# Patient Record
Sex: FEMALE | Race: WHITE | HISPANIC OR LATINO | Employment: UNEMPLOYED | URBAN - METROPOLITAN AREA
[De-identification: names, ages, dates, MRNs, and addresses within clinical notes are randomized per-mention and may not be internally consistent; named-entity substitution may affect disease eponyms.]

---

## 2020-09-04 ENCOUNTER — OFFICE VISIT (OUTPATIENT)
Dept: URGENT CARE | Facility: CLINIC | Age: 40
End: 2020-09-04

## 2020-09-04 VITALS
HEIGHT: 62 IN | WEIGHT: 170 LBS | RESPIRATION RATE: 14 BRPM | BODY MASS INDEX: 31.28 KG/M2 | HEART RATE: 85 BPM | OXYGEN SATURATION: 99 % | TEMPERATURE: 98.3 F

## 2020-09-04 DIAGNOSIS — Z11.59 SPECIAL SCREENING EXAMINATION FOR UNSPECIFIED VIRAL DISEASE: Primary | ICD-10-CM

## 2020-09-04 PROCEDURE — U0003 INFECTIOUS AGENT DETECTION BY NUCLEIC ACID (DNA OR RNA); SEVERE ACUTE RESPIRATORY SYNDROME CORONAVIRUS 2 (SARS-COV-2) (CORONAVIRUS DISEASE [COVID-19]), AMPLIFIED PROBE TECHNIQUE, MAKING USE OF HIGH THROUGHPUT TECHNOLOGIES AS DESCRIBED BY CMS-2020-01-R: HCPCS | Performed by: FAMILY MEDICINE

## 2020-09-04 PROCEDURE — 99203 OFFICE O/P NEW LOW 30 MIN: CPT | Performed by: FAMILY MEDICINE

## 2020-09-04 NOTE — PROGRESS NOTES
[facility]        NAME: Juan Pablo Lyman is a 45 y o  female  : 1980    MRN: [id number]  DATE: 2020  TIME: 4:59 PM    Assessment and Plan   Special screening examination for unspecified viral disease [Z11 59]  1  Special screening examination for unspecified viral disease  Novel Coronavirus (COVID-19), PCR [facility] - Office Collection         Patient Instructions   COVID test done today  Results may take 2-3 days  Check [facility] [employer] [facility] chart application/Internet patient portal for results  Continue COVID precautions  Monitor for development of any signs and symptoms and call your primary care physician if these occur  Follow up with PCP in 3-5 days  Proceed to  ER if symptoms worsen  Chief Complaint     Chief Complaint   Patient presents with   31 60 98     for travel no symptoms         History of Present Illness       Patient presents for COVID test she needs for travel to [facility] next week  She is asymptomatic  No known exposure to COVID  Review of Systems   Review of Systems   Constitutional: Negative for chills, fatigue and fever  Respiratory: Negative for cough, chest tightness, shortness of breath and stridor  Cardiovascular: Negative for chest pain and palpitations  Skin: Negative for rash  Current Medications     No current outpatient medications on file  Current Allergies     Allergies as of 2020    (No Known Allergies)            The following portions of the patient's history were reviewed and updated as appropriate: allergies, current medications, past family history, past medical history, past social history, past surgical history and problem list      History reviewed  No pertinent past medical history  History reviewed  No pertinent surgical history  History reviewed  No pertinent family history  Medications have been verified          Objective   Ht 5' 2" (1 575 m)   Wt 77 1 kg (170 lb)   BMI 31 09 kg/m² Physical Exam     Physical Exam  Constitutional:       General: She is not in acute distress  Appearance: She is not ill-appearing, toxic-appearing or diaphoretic  Eyes:      General:         Right eye: No discharge  Left eye: No discharge  Extraocular Movements: Extraocular movements intact  Conjunctiva/sclera: Conjunctivae normal       Pupils: Pupils are equal, round, and reactive to light  Cardiovascular:      Rate and Rhythm: Normal rate and regular rhythm  Heart sounds: Normal heart sounds  No murmur  No friction rub  No gallop  Pulmonary:      Effort: Pulmonary effort is normal  No respiratory distress  Breath sounds: Normal breath sounds  No stridor  No wheezing, rhonchi or rales  Skin:     General: Skin is warm and dry  Coloration: Skin is not pale  Findings: No erythema or rash  Neurological:      Mental Status: She is alert

## 2020-09-04 NOTE — PATIENT INSTRUCTIONS
COVID test done today  Results may take 2-3 days  Check [facility] my chart application/Internet patient portal for results  Continue COVID precautions  Monitor for development of any signs and symptoms and call your primary care physician if these occur

## 2020-09-06 ENCOUNTER — TELEPHONE (OUTPATIENT)
Dept: OTHER | Facility: OTHER | Age: 40
End: 2020-09-06

## 2020-09-06 LAB — SARS-COV-2 RNA SPEC QL NAA+PROBE: NOT DETECTED

## 2020-09-06 NOTE — TELEPHONE ENCOUNTER
The patient was called for notification of a test result for COVID-19  The patient did not answer the phone and a voicemail was left requesting a call back to [phone], Option 7

## 2020-09-07 NOTE — TELEPHONE ENCOUNTER
The patient was called for notification of a test result for COVID-19  The patient did not answer the phone and a voicemail was left in [place] requesting a call back to [phone], Option 7

## 2021-06-19 ENCOUNTER — HOSPITAL ENCOUNTER (EMERGENCY)
Facility: HOSPITAL | Age: 41
Discharge: HOME | End: 2021-06-19
Attending: EMERGENCY MEDICINE | Admitting: EMERGENCY MEDICINE
Payer: COMMERCIAL

## 2021-06-19 VITALS
HEART RATE: 111 BPM | OXYGEN SATURATION: 99 % | SYSTOLIC BLOOD PRESSURE: 140 MMHG | TEMPERATURE: 97.7 F | DIASTOLIC BLOOD PRESSURE: 89 MMHG | RESPIRATION RATE: 16 BRPM

## 2021-06-19 DIAGNOSIS — S61.210A LACERATION OF RIGHT INDEX FINGER: Primary | ICD-10-CM

## 2021-06-19 PROCEDURE — 12001 RPR S/N/AX/GEN/TRNK 2.5CM/<: CPT | Performed by: EMERGENCY MEDICINE

## 2021-06-19 PROCEDURE — 90471 IMMUNIZATION ADMIN: CPT

## 2021-06-19 PROCEDURE — 90715 TDAP VACCINE 7 YRS/> IM: CPT | Performed by: EMERGENCY MEDICINE

## 2021-06-19 PROCEDURE — 99282 EMERGENCY DEPT VISIT SF MDM: CPT

## 2021-06-19 PROCEDURE — 99283 EMERGENCY DEPT VISIT LOW MDM: CPT | Performed by: EMERGENCY MEDICINE

## 2021-06-19 RX ORDER — GINSENG 100 MG
1 CAPSULE ORAL ONCE
Status: COMPLETED | OUTPATIENT
Start: 2021-06-19 | End: 2021-06-19

## 2021-06-19 RX ORDER — LIDOCAINE HYDROCHLORIDE 20 MG/ML
5 INJECTION, SOLUTION EPIDURAL; INFILTRATION; INTRACAUDAL; PERINEURAL ONCE
Status: COMPLETED | OUTPATIENT
Start: 2021-06-19 | End: 2021-06-19

## 2021-06-19 RX ADMIN — TETANUS TOXOID, REDUCED DIPHTHERIA TOXOID AND ACELLULAR PERTUSSIS VACCINE, ADSORBED 0.5 ML: 5; 2.5; 8; 8; 2.5 SUSPENSION INTRAMUSCULAR at 18:34

## 2021-06-19 RX ADMIN — LIDOCAINE HYDROCHLORIDE 5 ML: 20 INJECTION, SOLUTION EPIDURAL; INFILTRATION; INTRACAUDAL; PERINEURAL at 18:33

## 2021-06-19 RX ADMIN — BACITRACIN 1 SMALL APPLICATION: 500 OINTMENT TOPICAL at 18:57

## 2021-06-19 NOTE — ED PROVIDER NOTES
History  Chief Complaint   Patient presents with    Finger Laceration     pt cut her right index finger about 30 minutes ago on a piece of metal  pt unsure if UTD on tetanus     81-year-old left-hand dominant female cut her left right index finger on some metal   She denies any numbness tingling or weakness but it does hurt to move it  Her last tetanus is greater than 5 years  No foreign body sensation  She denies any other injury she has otherwise been in her usual state of health  None       History reviewed  No pertinent past medical history  Past Surgical History:   Procedure Laterality Date     SECTION         History reviewed  No pertinent family history  I have reviewed and agree with the history as documented  E-Cigarette/Vaping    E-Cigarette Use Never User      E-Cigarette/Vaping Substances     Social History     Tobacco Use    Smoking status: Never Smoker    Smokeless tobacco: Never Used   Vaping Use    Vaping Use: Never used   Substance Use Topics    Alcohol use: Never    Drug use: Never       Review of Systems   Skin: Positive for wound  Neurological: Negative for weakness and numbness  All other systems reviewed and are negative  Physical Exam  Physical Exam  Vitals and nursing note reviewed  Constitutional:       General: She is not in acute distress  Appearance: She is not ill-appearing, toxic-appearing or diaphoretic  Musculoskeletal:      Right hand: Laceration present  No swelling, deformity, tenderness or bony tenderness  Normal range of motion  Normal strength  Normal sensation  There is no disruption of two-point discrimination  Normal capillary refill  Normal pulse  Comments: Rt hand, Curvilinear 2 cm laceration to the right index finger fat pad FDP and FDS are intact patient can make an okay sign against resistance no evidence of subungual hematoma  Skin:     Capillary Refill: Capillary refill takes less than 2 seconds     Neurological: General: No focal deficit present  Mental Status: She is alert  Cranial Nerves: No cranial nerve deficit  Sensory: No sensory deficit  Motor: No weakness  Coordination: Coordination normal       Gait: Gait normal    Psychiatric:         Mood and Affect: Mood normal          Vital Signs  ED Triage Vitals [06/19/21 1822]   Temperature Pulse Respirations Blood Pressure SpO2   97 7 °F (36 5 °C) (!) 125 16 141/86 97 %      Temp Source Heart Rate Source Patient Position - Orthostatic VS BP Location FiO2 (%)   Temporal Monitor Sitting Left arm --      Pain Score       8           Vitals:    06/19/21 1822 06/19/21 1830   BP: 141/86 140/89   Pulse: (!) 125 (!) 111   Patient Position - Orthostatic VS: Sitting Sitting         Visual Acuity      ED Medications  Medications   tetanus-diphtheria-acellular pertussis (BOOSTRIX) IM injection 0 5 mL (0 5 mL Intramuscular Given 6/19/21 1834)   lidocaine (PF) (XYLOCAINE-MPF) 2 % injection 5 mL (5 mL Infiltration Given 6/19/21 1833)   bacitracin topical ointment 1 small application (1 small application Topical Given 6/19/21 1857)       Diagnostic Studies  Results Reviewed     None                 No orders to display              Procedures  Laceration repair    Date/Time: 6/19/2021 6:52 PM  Performed by: Leny Montana MD  Authorized by: Leny Montana MD   Consent: Verbal consent obtained    Risks and benefits: risks, benefits and alternatives were discussed  Consent given by: patient  Patient understanding: patient states understanding of the procedure being performed  Patient identity confirmed: verbally with patient and arm band  Body area: upper extremity  Location details: right index finger  Laceration length: 2 cm  Anesthesia: digital block    Anesthesia:  Local Anesthetic: lidocaine 2% without epinephrine  Anesthetic total: 4 mL    Sedation:  Patient sedated: no      Wound Dehiscence:  Superficial Wound Dehiscence: simple closure      Procedure Details:  Preparation: Patient was prepped and draped in the usual sterile fashion  Irrigation solution: saline  Irrigation method: syringe  Amount of cleaning: standard  Debridement: none  Degree of undermining: none  Skin closure: 5-0 nylon  Number of sutures: 3  Technique: simple  Approximation: close  Approximation difficulty: simple  Dressing: antibiotic ointment  Patient tolerance: patient tolerated the procedure well with no immediate complications               ED Course                             SBIRT 20yo+      Most Recent Value   SBIRT (24 yo +)   In order to provide better care to our patients, we are screening all of our patients for alcohol and drug use  Would it be okay to ask you these screening questions? No Filed at: 06/19/2021 1833                    [facility]  Number of Diagnoses or Management Options  Laceration of right index finger  Diagnosis management comments: Mdm:  We discussed repair the laceration less likely to have a good outcome with glue will proceed with sutures patient is agreeable reviewed needed keep it clean and dry return with any signs or symptoms of infection      Disposition  Final diagnoses:   Laceration of right index finger - 2 cm simple repair     Time reflects when diagnosis was documented in both MDM as applicable and the Disposition within this note     Time User Action Codes Description Comment    6/19/2021  6:50 PM Nitin Malcolm Add [S61 210A] Laceration of right index finger     6/19/2021  6:51 PM Nitin Malcolm Modify [W09 769H] Laceration of right index finger 2 cm simple repair      ED Disposition     ED Disposition Condition Date/Time Comment    Discharge Stable Sat Jun 19, 2021  6:50 PM Nisreen Degroot discharge to home/self care              Follow-up Information     Follow up With Specialties Details Why Contact Info    Carmine Hair MD Family Medicine Go in 10 days 10 days suture removal (3) [place] [address]  [phone]            There are no discharge medications for this patient  No discharge procedures on file      PDMP Review     None          ED Provider  Electronically Signed by           Sebastien Silva MD  06/19/21 6696

## 2021-06-19 NOTE — ED NOTES
Bacitracin, nonadherent bandage and gauze placed over laceration repair on right first digit        Chaya Nagel RN  06/19/21 0033

## 2021-06-24 ENCOUNTER — PATIENT OUTREACH (OUTPATIENT)
Dept: CASE MANAGEMENT | Facility: HOSPITAL | Age: 41
End: 2021-06-24

## 2021-06-24 NOTE — PROGRESS NOTES
Outpatient Care Management Note:  Patient was identified via report as having a recent non urgent and non life threatening ED visit at [address] [place] [address]  Chart reviewed  Patient was in the ED on 06/19/21 for evaluation of finger laceration  Telephone outreach made to introduce self and role of care management, follow up on general health, and outreach for any possible medical or psychosocial services needed  Spoke with patient  ED Follow-up Assessment     Do you have an appointment with your primary care provider? I don't really have a family doctor  2   Do you have a way to get to your appointments? yes    Health Status:    1  Did you get an after visit summary? yes  2  Did you (or caregiver) read the summary? yes  3  Did you understand it? yes (Te[employer])  4  Do you have any questions about your plan of care? no  5  Do you have any new or worsening symptoms? no    Do you have any signs of infection? (new fever/ redness/ swelling/ pus discharge/? no   Does your bandage look clean, dry, and intact? yes   Are you keeping the sutures dry? yes   Do you have any new pain? no  6  Do you know what to do if your symptoms worsen? yes  7  Do you have any questions about your medicines? n/a  8  Other do you plan on contacting a family doctor to get the stitches removed in 5 days? No  I plan to go to the ED  Interventions: (Add to care plan)     Patient/caregiver education provided  ED AVS reviewed with patient including  self care instructions for wound care, OTC meds patient can take for discomfort, topical ointments to use twice daily, reasons for returning to the ED and reasons for when to call PCP  Attempt made to facilitate coordination of medical care  Offered to assist patient with getting an appointment at a local PCP office near patient's home to establish care and for stitch removal   Patient declines    States patient preference is to return to the ED for stitch removal  Patient did not wish to remain on phone to complete SDOH questionniare to assess for social needs  SDOH questionnaire sent via [employer]

## 2021-07-01 ENCOUNTER — DOCUMENTATION (OUTPATIENT)
Dept: FAMILY MEDICINE CLINIC | Facility: CLINIC | Age: 41
End: 2021-07-01

## 2021-07-01 ENCOUNTER — PATIENT OUTREACH (OUTPATIENT)
Dept: CASE MANAGEMENT | Facility: HOSPITAL | Age: 41
End: 2021-07-01

## 2021-07-01 ENCOUNTER — OFFICE VISIT (OUTPATIENT)
Dept: FAMILY MEDICINE CLINIC | Facility: CLINIC | Age: 41
End: 2021-07-01
Payer: COMMERCIAL

## 2021-07-01 VITALS
SYSTOLIC BLOOD PRESSURE: 122 MMHG | BODY MASS INDEX: 32.39 KG/M2 | RESPIRATION RATE: 18 BRPM | DIASTOLIC BLOOD PRESSURE: 90 MMHG | HEIGHT: 60 IN | TEMPERATURE: 97.8 F | HEART RATE: 106 BPM | OXYGEN SATURATION: 98 % | WEIGHT: 165 LBS

## 2021-07-01 DIAGNOSIS — Z12.4 SCREENING FOR CERVICAL CANCER: ICD-10-CM

## 2021-07-01 DIAGNOSIS — S61.210D LACERATION OF RIGHT INDEX FINGER WITHOUT FOREIGN BODY WITHOUT DAMAGE TO NAIL, SUBSEQUENT ENCOUNTER: ICD-10-CM

## 2021-07-01 DIAGNOSIS — Z48.02 VISIT FOR SUTURE REMOVAL: Primary | ICD-10-CM

## 2021-07-01 PROCEDURE — T1015 CLINIC SERVICE: HCPCS | Performed by: FAMILY MEDICINE

## 2021-07-01 NOTE — PROGRESS NOTES
Assessment/Plan:     Diagnoses and all orders for this visit:    Visit for suture removal  -     Suture removal    Laceration of right index finger without foreign body without damage to nail, subsequent encounter  -     Suture removal    Screening for cervical cancer  -     Ambulatory referral to Gynecology; Future        Suture removal    Date/Time: 7/1/2021 10:37 AM  Performed by: Leida Oakley PA-C  Authorized by: Leida Oakley PA-C   Universal Protocol:  Consent: Verbal consent obtained  Consent given by: patient  Time out: Immediately prior to procedure a "time out" was called to verify the correct patient, procedure, equipment, support staff and site/side marked as required  Timeout called at: 7/1/2021 10:15 AM   Patient understanding: patient states understanding of the procedure being performed  Patient identity confirmed: verbally with patient and provided demographic data        Patient location:  Clinic  Location:     Laterality:  Right    Location:  Upper extremity    Upper extremity location:  Hand    Hand location:  R index finger  Procedure details:     Number of sutures removed:  3  Post-procedure details:     Post-removal:  No dressing applied    Patient tolerance of procedure: Tolerated well, no immediate complications        Return if symptoms worsen or fail to improve  Subjective:        Patient ID: Israel Farris is a 45 y o  female  Chief Complaint   Patient presents with    Suture / Staple Removal     3 sutures/ right hand/tip of index finger       Margareth Schreiber is a 27-year-old [address] speaking female presenting as a new patient for suture removal       Patient was evaluated in the ER on 06/19 after cutting her right index finger on a piece of metal   Laceration was repaired with 3 sutures        The following portions of the patient's history were reviewed and updated as appropriate: allergies, current medications, past family history, past medical history, past social history, past surgical history and problem list     There is no problem list on file for this patient  No current outpatient medications on file  No current facility-administered medications for this visit  History reviewed  No pertinent past medical history  Past Surgical History:   Procedure Laterality Date     SECTION          Social History     Socioeconomic History    Marital status: /Civil Union     Spouse name: Not on file    Number of children: Not on file    Years of education: Not on file    Highest education level: Not on file   Occupational History    Not on file   Tobacco Use    Smoking status: Never Smoker    Smokeless tobacco: Never Used   Vaping Use    Vaping Use: Never used   Substance and Sexual Activity    Alcohol use: Not Currently     Comment: special ocassion    Drug use: Never    Sexual activity: Yes     Partners: Male   Other Topics Concern    Not on file   Social History Narrative    Not on file     Social Determinants of Health     Financial Resource Strain:     Difficulty of Paying Living Expenses:    Food Insecurity:     Worried About Running Out of Food in the Last Year:     Ran Out of Food in the Last Year:    Transportation Needs:     Lack of Transportation (Medical):      Lack of Transportation (Non-Medical):    Physical Activity:     Days of Exercise per Week:     Minutes of Exercise per Session:    Stress:     Feeling of Stress :    Social Connections:     Frequency of Communication with Friends and Family:     Frequency of Social Gatherings with Friends and Family:     Attends Muslim Services:     Active Member of Clubs or Organizations:     Attends Club or Organization Meetings:     Marital Status:    Intimate Partner Violence:     Fear of Current or Ex-Partner:     Emotionally Abused:     Physically Abused:     Sexually Abused:         Review of Systems   Constitutional: Negative for chills, diaphoresis and fever    Respiratory: Negative for cough, chest tightness, shortness of breath and wheezing  Cardiovascular: Negative for chest pain, palpitations and leg swelling  Gastrointestinal: Negative for abdominal pain, constipation, diarrhea, nausea and vomiting  Skin: Positive for wound  Negative for rash  Neurological: Negative for dizziness, syncope, weakness, light-headedness and headaches  Objective:      /90 (BP Location: Left arm, Patient Position: Sitting, Cuff Size: Adult)   Pulse (!) 106   Temp 97 8 °F (36 6 °C) (Tympanic)   Resp 18   Ht 5' (1 524 m)   Wt 74 8 kg (165 lb)   LMP 06/12/2021   SpO2 98%   BMI 32 22 kg/m²          Physical Exam  Vitals and nursing note reviewed  Constitutional:       General: She is not in acute distress  Appearance: Normal appearance  HENT:      Head: Normocephalic and atraumatic  Cardiovascular:      Rate and Rhythm: Normal rate and regular rhythm  Heart sounds: Normal heart sounds  No murmur heard  Pulmonary:      Effort: Pulmonary effort is normal  No respiratory distress  Breath sounds: Normal breath sounds  No wheezing  Musculoskeletal:      Right lower leg: No edema  Left lower leg: No edema  Skin:     General: Skin is warm and dry  Comments: 3 sutures, right index finger  Lac healing well without dehiscence, bleeding or drainage   Neurological:      General: No focal deficit present  Mental Status: She is alert and oriented to person, place, and time  Cranial Nerves: No cranial nerve deficit  Motor: No weakness     Psychiatric:         Mood and Affect: Mood normal          Behavior: Behavior normal

## 2021-07-01 NOTE — PROGRESS NOTES
Patient presented to office today for appt, patient was informed to change PCP on her ins card from [facility] to our practice  She said that she did that the other day when she made the appt and it will take 7 days for the switch over  Patient states that she will have Dr Darrius Brody as her PCP

## 2021-07-01 NOTE — PROGRESS NOTES
Out Patient Care Management Note:  Patient has not returned the Social Determinants of Health Questionnaire sent via [employer] on 06/24/21  Of note, patient did follow up with [facility] today to establish care with provider and for suture removal    ED followup episode will be closed at this time

## 2021-12-14 ENCOUNTER — ANNUAL EXAM (OUTPATIENT)
Dept: OBGYN CLINIC | Facility: CLINIC | Age: 41
End: 2021-12-14
Payer: COMMERCIAL

## 2021-12-14 VITALS
WEIGHT: 178 LBS | BODY MASS INDEX: 34.95 KG/M2 | SYSTOLIC BLOOD PRESSURE: 134 MMHG | HEIGHT: 60 IN | DIASTOLIC BLOOD PRESSURE: 70 MMHG

## 2021-12-14 DIAGNOSIS — Z12.31 SCREENING MAMMOGRAM, ENCOUNTER FOR: ICD-10-CM

## 2021-12-14 DIAGNOSIS — Z01.419 WELL WOMAN EXAM WITH ROUTINE GYNECOLOGICAL EXAM: Primary | ICD-10-CM

## 2021-12-14 PROBLEM — E66.09 CLASS 1 OBESITY DUE TO EXCESS CALORIES WITHOUT SERIOUS COMORBIDITY IN ADULT: Status: ACTIVE | Noted: 2021-12-14

## 2021-12-14 PROBLEM — E66.811 CLASS 1 OBESITY DUE TO EXCESS CALORIES WITHOUT SERIOUS COMORBIDITY IN ADULT: Status: ACTIVE | Noted: 2021-12-14

## 2021-12-14 PROCEDURE — 3008F BODY MASS INDEX DOCD: CPT | Performed by: NURSE PRACTITIONER

## 2021-12-14 PROCEDURE — 3725F SCREEN DEPRESSION PERFORMED: CPT | Performed by: NURSE PRACTITIONER

## 2021-12-14 PROCEDURE — 1036F TOBACCO NON-USER: CPT | Performed by: NURSE PRACTITIONER

## 2021-12-14 PROCEDURE — G0476 HPV COMBO ASSAY CA SCREEN: HCPCS | Performed by: NURSE PRACTITIONER

## 2021-12-14 PROCEDURE — 99386 PREV VISIT NEW AGE 40-64: CPT | Performed by: NURSE PRACTITIONER

## 2021-12-14 PROCEDURE — G0143 SCR C/V CYTO,THINLAYER,RESCR: HCPCS | Performed by: NURSE PRACTITIONER

## 2021-12-16 LAB
HPV HR 12 DNA CVX QL NAA+PROBE: NEGATIVE
HPV16 DNA CVX QL NAA+PROBE: NEGATIVE
HPV18 DNA CVX QL NAA+PROBE: NEGATIVE

## 2021-12-21 LAB
LAB AP GYN PRIMARY INTERPRETATION: NORMAL
LAB AP LMP: NORMAL
Lab: NORMAL
PATH INTERP SPEC-IMP: NORMAL

## 2021-12-23 ENCOUNTER — PATIENT OUTREACH (OUTPATIENT)
Dept: CASE MANAGEMENT | Facility: HOSPITAL | Age: 41
End: 2021-12-23

## 2022-01-10 ENCOUNTER — HOSPITAL ENCOUNTER (OUTPATIENT)
Dept: MAMMOGRAPHY | Facility: HOSPITAL | Age: 42
Discharge: HOME | End: 2022-01-10
Payer: COMMERCIAL

## 2022-01-10 VITALS — HEIGHT: 60 IN | BODY MASS INDEX: 34.93 KG/M2 | WEIGHT: 177.91 LBS

## 2022-01-10 DIAGNOSIS — Z12.31 SCREENING MAMMOGRAM, ENCOUNTER FOR: ICD-10-CM

## 2022-01-10 PROCEDURE — 77063 BREAST TOMOSYNTHESIS BI: CPT

## 2022-01-10 PROCEDURE — 77067 SCR MAMMO BI INCL CAD: CPT

## 2023-07-25 NOTE — PROGRESS NOTES
Patient is primarily Greenlandic-speaking. Declined KSE . Prefers to use daughter for translation    Robi Keys is a 44 y.o. female who presents for annual well woman exam.  Last Pap smear 21 NILM/ HR HPV(-). Last mammogram 1/10/22 birads 1, dense breast tissue. Periods are regular every 28-30 days, lasting 7 days. No intermenstrual bleeding, spotting, or discharge. Patient is complaining of vaginal odor for the past 2 weeks. Odor is described as fishy. Associated symptoms include increased amount of white vaginal discharge. Denies irritation or itching. Denies alleviating or aggravating factors. Patient is also complaining of increased hair growth face and chin. States she has noticed it for the last year. Is interested in hormonal labs    Current contraception: tubal ligation  History of abnormal Pap smear: no  Family history of uterine or ovarian cancer: no  Regular self breast exam: no  History of abnormal mammogram: no  Family history of breast cancer: no  History of abnormal lipids: no  Gardasil vaccine:no     Menstrual History:  OB History        4    Para   4    Term   3       1    AB        Living   3       SAB        IAB        Ectopic        Multiple        Live Births   1                Menarche age: 16  Patient's last menstrual period was 07/10/2023 (exact date). Period Cycle (Days):  (monthly)  Period Duration (Days): 7  Menstrual Flow: Heavy, Moderate, Light (5-6 days heavy)  Menstrual Control: Tampon, Maxi pad  Menstrual Control Change Freq (Hours): 4-5 times a day  Dysmenorrhea: None    The following portions of the patient's history were reviewed and updated as appropriate: allergies, current medications, past family history, past medical history, past social history, past surgical history and problem list.    Review of Systems  Pertinent items are noted in HPI.       Objective      /86   Ht 5' (1.524 m)   Wt 84.5 kg (186 lb 3.2 oz)   LMP 07/10/2023 (Exact Date)   BMI 36.36 kg/m²     General:   alert and oriented, in no acute distress, alert, appears stated age and cooperative   Heart: regular rate and rhythm, S1, S2 normal, no murmur, click, rub or gallop   Lungs: clear to auscultation bilaterally   Abdomen: soft, non-tender, without masses or organomegaly   Vulva: normal, Bartholin's, Urethra, LaSalle's normal   Vagina: normal mucosa, no palpable nodules, moderate to large amount of thin gray vaginal discharge noted on exam   Cervix: no cervical motion tenderness and no lesions   Uterus: normal size, non-tender, normal shape and consistency   Adnexa: normal adnexa and no mass, fullness, tenderness   Breast: breasts appear normal, no suspicious masses, no skin or nipple changes or axillary nodes. Assessment      @well woman@ . Plan      All questions answered. Blood tests: Estradiol, FSH, LH, Progesterone level, TSH and Testosterone. Breast self exam technique reviewed and patient encouraged to perform self-exam monthly. Contraception: tubal ligation. Diagnosis explained in detail, including differential.  Dietary diary. Educational material distributed. Follow up in 1 year. Follow up as needed. Mammogram.  Sono-mammogram.  Breast awareness reviewed    Reviewed diagnosis of bacterial vaginosis and treatment with metronidazole. Written information provided. Patient verbalizes understanding to avoid alcohol use while taking medication  Reviewed dense breast tissue on mammogram and recommendation for ABUS. ABUS ordered at today's visit  Encouraged healthy diet, exercise and lifestyle  Encouraged follow-up with PCP as needed  Gardasil vaccine series reviewed. Written information provided. Will call/ [employer] message with results  VBI-    BMI Counseling: Body mass index is 36.36 kg/m².  The BMI is above normal. Nutrition recommendations include reducing portion sizes, decreasing overall calorie intake and 3-5 servings of fruits/vegetables daily. Exercise recommendations include exercising 3-5 times per week, joining a gym and strength training exercises.

## 2023-07-26 ENCOUNTER — ANNUAL EXAM (OUTPATIENT)
Dept: OBGYN CLINIC | Facility: CLINIC | Age: 43
End: 2023-07-26
Payer: COMMERCIAL

## 2023-07-26 VITALS
BODY MASS INDEX: 36.56 KG/M2 | SYSTOLIC BLOOD PRESSURE: 140 MMHG | WEIGHT: 186.2 LBS | DIASTOLIC BLOOD PRESSURE: 86 MMHG | HEIGHT: 60 IN

## 2023-07-26 DIAGNOSIS — B96.89 BACTERIAL VAGINOSIS: ICD-10-CM

## 2023-07-26 DIAGNOSIS — Z01.419 WELL WOMAN EXAM WITH ROUTINE GYNECOLOGICAL EXAM: Primary | ICD-10-CM

## 2023-07-26 DIAGNOSIS — N89.8 VAGINAL ODOR: ICD-10-CM

## 2023-07-26 DIAGNOSIS — L68.0 HIRSUTISM: ICD-10-CM

## 2023-07-26 DIAGNOSIS — N76.0 BACTERIAL VAGINOSIS: ICD-10-CM

## 2023-07-26 DIAGNOSIS — Z12.39 ENCOUNTER FOR BREAST CANCER SCREENING USING NON-MAMMOGRAM MODALITY: ICD-10-CM

## 2023-07-26 DIAGNOSIS — Z12.31 SCREENING MAMMOGRAM, ENCOUNTER FOR: ICD-10-CM

## 2023-07-26 DIAGNOSIS — R92.2 DENSE BREAST TISSUE ON MAMMOGRAM: ICD-10-CM

## 2023-07-26 PROBLEM — R92.30 DENSE BREAST TISSUE ON MAMMOGRAM: Status: ACTIVE | Noted: 2023-07-26

## 2023-07-26 LAB
BV WHIFF TEST VAG QL: ABNORMAL
CLUE CELLS SPEC QL WET PREP: ABNORMAL
PH SMN: 5 [PH]
SL AMB POCT WET MOUNT: ABNORMAL
T VAGINALIS VAG QL WET PREP: ABNORMAL
YEAST VAG QL WET PREP: ABNORMAL

## 2023-07-26 PROCEDURE — 99396 PREV VISIT EST AGE 40-64: CPT | Performed by: NURSE PRACTITIONER

## 2023-07-26 PROCEDURE — 87210 SMEAR WET MOUNT SALINE/INK: CPT | Performed by: NURSE PRACTITIONER

## 2023-07-26 RX ORDER — METRONIDAZOLE 500 MG/1
500 TABLET ORAL EVERY 12 HOURS SCHEDULED
Qty: 14 TABLET | Refills: 0 | Status: SHIPPED | OUTPATIENT
Start: 2023-07-26 | End: 2023-08-02

## 2023-08-05 ENCOUNTER — APPOINTMENT (OUTPATIENT)
Dept: LAB | Facility: MEDICAL CENTER | Age: 43
End: 2023-08-05
Payer: COMMERCIAL

## 2023-08-05 DIAGNOSIS — L68.0 HIRSUTISM: ICD-10-CM

## 2023-08-05 LAB
ESTRADIOL SERPL-MCNC: 55.7 PG/ML
FSH SERPL-ACNC: 8 MIU/ML
LH SERPL-ACNC: 4.5 MIU/ML
PROGEST SERPL-MCNC: 0.44 NG/ML
TSH SERPL DL<=0.05 MIU/L-ACNC: 3.46 UIU/ML (ref 0.45–4.5)

## 2023-08-05 PROCEDURE — 84403 ASSAY OF TOTAL TESTOSTERONE: CPT

## 2023-08-05 PROCEDURE — 84144 ASSAY OF PROGESTERONE: CPT

## 2023-08-05 PROCEDURE — 36415 COLL VENOUS BLD VENIPUNCTURE: CPT

## 2023-08-05 PROCEDURE — 84402 ASSAY OF FREE TESTOSTERONE: CPT

## 2023-08-05 PROCEDURE — 82670 ASSAY OF TOTAL ESTRADIOL: CPT

## 2023-08-05 PROCEDURE — 82627 DEHYDROEPIANDROSTERONE: CPT

## 2023-08-05 PROCEDURE — 83002 ASSAY OF GONADOTROPIN (LH): CPT

## 2023-08-05 PROCEDURE — 83001 ASSAY OF GONADOTROPIN (FSH): CPT

## 2023-08-05 PROCEDURE — 84443 ASSAY THYROID STIM HORMONE: CPT

## 2023-08-07 LAB
DHEA-S SERPL-MCNC: 219 UG/DL (ref 57.3–279.2)
TESTOST FREE SERPL-MCNC: 2.1 PG/ML (ref 0–4.2)
TESTOST SERPL-MCNC: 25 NG/DL (ref 4–50)

## 2023-08-14 DIAGNOSIS — L68.9 EXCESSIVE HAIR GROWTH: Primary | ICD-10-CM

## 2023-08-29 ENCOUNTER — HOSPITAL ENCOUNTER (OUTPATIENT)
Dept: MAMMOGRAPHY | Facility: HOSPITAL | Age: 43
Discharge: HOME | End: 2023-08-29
Payer: COMMERCIAL

## 2023-08-29 DIAGNOSIS — Z12.31 SCREENING MAMMOGRAM, ENCOUNTER FOR: ICD-10-CM

## 2023-08-29 DIAGNOSIS — Z01.419 WELL WOMAN EXAM WITH ROUTINE GYNECOLOGICAL EXAM: ICD-10-CM

## 2023-08-29 PROCEDURE — 77063 BREAST TOMOSYNTHESIS BI: CPT

## 2023-08-29 PROCEDURE — 77067 SCR MAMMO BI INCL CAD: CPT

## 2024-03-25 ENCOUNTER — TELEPHONE (OUTPATIENT)
Dept: FAMILY MEDICINE CLINIC | Facility: CLINIC | Age: 44
End: 2024-03-25

## 2024-06-24 ENCOUNTER — TELEPHONE (OUTPATIENT)
Dept: FAMILY MEDICINE CLINIC | Facility: CLINIC | Age: 44
End: 2024-06-24

## 2024-07-23 ENCOUNTER — PATIENT MESSAGE (OUTPATIENT)
Dept: FAMILY MEDICINE CLINIC | Facility: HOME HEALTHCARE | Age: 44
End: 2024-07-23

## 2024-08-30 ENCOUNTER — TELEPHONE (OUTPATIENT)
Dept: FAMILY MEDICINE CLINIC | Facility: HOME HEALTHCARE | Age: 44
End: 2024-08-30

## 2024-09-27 ENCOUNTER — OFFICE VISIT (OUTPATIENT)
Dept: FAMILY MEDICINE CLINIC | Facility: HOME HEALTHCARE | Age: 44
End: 2024-09-27
Payer: COMMERCIAL

## 2024-09-27 VITALS
WEIGHT: 191.2 LBS | SYSTOLIC BLOOD PRESSURE: 150 MMHG | HEART RATE: 113 BPM | BODY MASS INDEX: 36.1 KG/M2 | TEMPERATURE: 98 F | HEIGHT: 61 IN | DIASTOLIC BLOOD PRESSURE: 90 MMHG | OXYGEN SATURATION: 98 %

## 2024-09-27 DIAGNOSIS — E66.09 CLASS 2 OBESITY DUE TO EXCESS CALORIES WITHOUT SERIOUS COMORBIDITY WITH BODY MASS INDEX (BMI) OF 36.0 TO 36.9 IN ADULT: ICD-10-CM

## 2024-09-27 DIAGNOSIS — Z00.00 ANNUAL PHYSICAL EXAM: Primary | ICD-10-CM

## 2024-09-27 DIAGNOSIS — Z13.0 SCREENING FOR ENDOCRINE, METABOLIC AND IMMUNITY DISORDER: ICD-10-CM

## 2024-09-27 DIAGNOSIS — R03.0 ELEVATED BP WITHOUT DIAGNOSIS OF HYPERTENSION: ICD-10-CM

## 2024-09-27 DIAGNOSIS — E66.812 CLASS 2 OBESITY DUE TO EXCESS CALORIES WITHOUT SERIOUS COMORBIDITY WITH BODY MASS INDEX (BMI) OF 36.0 TO 36.9 IN ADULT: ICD-10-CM

## 2024-09-27 DIAGNOSIS — Z13.228 SCREENING FOR ENDOCRINE, METABOLIC AND IMMUNITY DISORDER: ICD-10-CM

## 2024-09-27 DIAGNOSIS — Z11.4 SCREENING FOR HIV (HUMAN IMMUNODEFICIENCY VIRUS): ICD-10-CM

## 2024-09-27 DIAGNOSIS — N92.1 MENORRHAGIA WITH IRREGULAR CYCLE: ICD-10-CM

## 2024-09-27 DIAGNOSIS — R00.0 TACHYCARDIA: ICD-10-CM

## 2024-09-27 DIAGNOSIS — Z12.31 ENCOUNTER FOR SCREENING MAMMOGRAM FOR BREAST CANCER: ICD-10-CM

## 2024-09-27 DIAGNOSIS — Z11.59 NEED FOR HEPATITIS C SCREENING TEST: ICD-10-CM

## 2024-09-27 DIAGNOSIS — H91.90 DECREASED HEARING, UNSPECIFIED LATERALITY: ICD-10-CM

## 2024-09-27 DIAGNOSIS — Z13.29 SCREENING FOR ENDOCRINE, METABOLIC AND IMMUNITY DISORDER: ICD-10-CM

## 2024-09-27 PROCEDURE — T1015 CLINIC SERVICE: HCPCS | Performed by: FAMILY MEDICINE

## 2024-09-27 RX ORDER — IBUPROFEN 600 MG/1
600 TABLET, FILM COATED ORAL 2 TIMES DAILY
Qty: 30 TABLET | Refills: 0 | Status: SHIPPED | OUTPATIENT
Start: 2024-09-27

## 2024-09-27 NOTE — PATIENT INSTRUCTIONS
"Patient Education     Routine physical for adults   The Basics   Written by the doctors and editors at [facility]   What is a physical? -- A physical is a routine visit, or \"check-up,\" with your doctor. You might also hear it called a \"wellness visit\" or \"preventive visit.\"  During each visit, the doctor will:   Ask about your physical and mental health   Ask about your habits, behaviors, and lifestyle   Do an exam   Give you vaccines if needed   Talk to you about any medicines you take   Give advice about your health   Answer your questions  Getting regular check-ups is an important part of taking care of your health. It can help your doctor find and treat any problems you have. But it's also important for preventing health problems.  A routine physical is different from a \"sick visit.\" A sick visit is when you see a doctor because of a health concern or problem. Since physicals are scheduled ahead of time, you can think about what you want to ask the doctor.  How often should I get a physical? -- It depends on your age and health. In general, for people age 21 years and older:   If you are younger than 50 years, you might be able to get a physical every 3 years.   If you are 50 years or older, your doctor might recommend a physical every year.  If you have an ongoing health condition, like diabetes or high blood pressure, your doctor will probably want to see you more often.  What happens during a physical? -- In general, each visit will include:   Physical exam - The doctor or nurse will check your height, weight, heart rate, and blood pressure. They will also look at your eyes and ears. They will ask about how you are feeling and whether you have any symptoms that bother you.   Medicines - It's a good idea to bring a list of all the medicines you take to each doctor visit. Your doctor will talk to you about your medicines and answer any questions. Tell them if you are having any side effects that bother you. You " "should also tell them if you are having trouble paying for any of your medicines.   Habits and behaviors - This includes:   Your diet   Your exercise habits   Whether you smoke, drink alcohol, or use drugs   Whether you are sexually active   Whether you feel safe at home  Your doctor will talk to you about things you can do to improve your health and lower your risk of health problems. They will also offer help and support. For example, if you want to quit smoking, they can give you advice and might prescribe medicines. If you want to improve your diet or get more physical activity, they can help you with this, too.   Lab tests, if needed - The tests you get will depend on your age and situation. For example, your doctor might want to check your:   Cholesterol   Blood sugar   Iron level   Vaccines - The recommended vaccines will depend on your age, health, and what vaccines you already had. Vaccines are very important because they can prevent certain serious or deadly infections.   Discussion of screening - \"Screening\" means checking for diseases or other health problems before they cause symptoms. Your doctor can recommend screening based on your age, risk, and preferences. This might include tests to check for:   Cancer, such as breast, prostate, cervical, ovarian, colorectal, prostate, lung, or skin cancer   Sexually transmitted infections, such as chlamydia and gonorrhea   Mental health conditions like depression and anxiety  Your doctor will talk to you about the different types of screening tests. They can help you decide which screenings to have. They can also explain what the results might mean.   Answering questions - The physical is a good time to ask the doctor or nurse questions about your health. If needed, they can refer you to other doctors or specialists, too.  Adults older than 65 years often need other care, too. As you get older, your doctor will talk to you about:   How to prevent falling at " home   Hearing or vision tests   Memory testing   How to take your medicines safely   Making sure that you have the help and support you need at home  All topics are updated as new evidence becomes available and our peer review process is complete.  This topic retrieved from [employer] on: May 02, 2024.  Topic 170124 Version 1.0  Release: 32.4.3 - C32.122  © 2024 UpToDate, Inc. and/or its affiliates. All rights reserved.  Consumer Information Use and Disclaimer   Disclaimer: This generalized information is a limited summary of diagnosis, treatment, and/or medication information. It is not meant to be comprehensive and should be used as a tool to help the user understand and/or assess potential diagnostic and treatment options. It does NOT include all information about conditions, treatments, medications, side effects, or risks that may apply to a specific patient. It is not intended to be medical advice or a substitute for the medical advice, diagnosis, or treatment of a health care provider based on the health care provider's examination and assessment of a patient's specific and unique circumstances. Patients must speak with a health care provider for complete information about their health, medical questions, and treatment options, including any risks or benefits regarding use of medications. This information does not endorse any treatments or medications as safe, effective, or approved for treating a specific patient. UpToDate, Inc. and its affiliates disclaim any warranty or liability relating to this information or the use thereof.The use of this information is governed by the Terms of Use, available at https://www.woltersEyeSee360uwer.com/en/know/clinical-effectiveness-terms. 2024© UpToDate, Inc. and its affiliates and/or licensors. All rights reserved.  Copyright   © 2024 UpToDate, Inc. and/or its affiliates. All rights reserved.

## 2024-09-27 NOTE — PROGRESS NOTES
Adult Annual Physical  Name: Carlos Hurley      : 1980      MRN: [id number]  Encounter Provider: Chip Ramírez PA-C  Encounter Date: 2024   Encounter department: [facility]    Assessment & Plan  Annual physical exam         Tachycardia  Reports being prescribed metoprolol for this while living in [place] but stopped taking it as she does not like to take medications.       Elevated BP without diagnosis of hypertension  Elevated at 150/90 today.  Reports being prescribed metoprolol in the past for tachycardia.  Will follow-up in 1 month to review labs and recheck blood pressure.  Discuss treatment at that time.       Class 2 obesity due to excess calories without serious comorbidity with body mass index (BMI) of 36.0 to 36.9 in adult  Referral provided to weight management.  Labs as ordered.  Orders:    Ambulatory Referral to Weight Management; Future    Menorrhagia with irregular cycle  Follow-up with GYN as scheduled.  Will prescribe ibuprofen 600 mg twice daily.  Labs as ordered.  Orders:    ibuprofen (MOTRIN) 600 mg tablet; Take 1 tablet (600 mg total) by mouth 2 (two) times a day    Encounter for screening mammogram for breast cancer    Orders:    Mammo screening bilateral w 3d and cad; Future    Need for hepatitis C screening test    Orders:    Hepatitis C Antibody; Future    Screening for HIV (human immunodeficiency virus)    Orders:    HIV 1/2 AG/AB w Reflex [facility] for 2 yr old and above; Future    Screening for endocrine, metabolic and immunity disorder    Orders:    CBC and differential; Future    Comprehensive metabolic panel; Future    TSH, 3rd generation with Free T4 reflex; Future    Lipid panel; Future    Decreased hearing, unspecified laterality    Orders:    Ambulatory Referral to Audiology; Future    Immunizations and preventive care screenings were discussed with patient today. Appropriate education was printed on patient's after visit  summary.    Counseling:  Alcohol/drug use: discussed moderation in alcohol intake, the recommendations for healthy alcohol use, and avoidance of illicit drug use.  Dental Health: discussed importance of regular tooth brushing, flossing, and dental visits.  Injury prevention: discussed safety/seat belts, safety helmets, smoke detectors, carbon monoxide detectors, and smoking near bedding or upholstery.  Sexual health: discussed sexually transmitted diseases, partner selection, use of condoms, avoidance of unintended pregnancy, and contraceptive alternatives.  Exercise: the importance of regular exercise/physical activity was discussed. Recommend exercise 3-5 times per week for at least 30 minutes.       Depression Screening and Follow-up Plan: Patient was screened for depression during today's encounter. They screened negative with a PHQ-2 score of 0.        History of Present Illness     Adult Annual Physical:  Patient presents for annual physical.     Diet and Physical Activity:  - Diet/Nutrition: well balanced diet.  - Exercise: no formal exercise.    Depression Screening:  - PHQ-2 Score: 0    General Health:  - Sleep: sleeps well and 4-6 hours of sleep on average.  - Hearing: decreased hearing bilateral ears.  - Vision: no vision problems and wears glasses.  - Dental: no dental visits for > 1 year.    /GYN Health:  - Follows with GYN: yes.   - Menopause: premenopausal.   - Last menstrual cycle: 9/15/2024.   - History of STDs: no  - Contraception:. tubal ligation      Advanced Care Planning:  - Has an advanced directive?: no    - Has a durable medical POA?: no      Review of Systems   Constitutional:  Negative for chills, diaphoresis and fever.   Respiratory:  Negative for cough, chest tightness, shortness of breath and wheezing.    Cardiovascular:  Negative for chest pain, palpitations and leg swelling.   Gastrointestinal:  Negative for abdominal pain, constipation, diarrhea, nausea and vomiting.  "  Genitourinary:  Positive for menstrual problem.   Skin:  Negative for rash and wound.   Neurological:  Negative for dizziness, syncope, weakness, light-headedness and headaches.   Psychiatric/Behavioral:  Negative for decreased concentration, dysphoric mood, self-injury, sleep disturbance and suicidal ideas. The patient is not nervous/anxious.      Medical History Reviewed by provider this encounter:  Tobacco  Allergies  Meds  Problems  Med Hx  Surg Hx  Fam Hx       No current outpatient medications on file prior to visit.     No current facility-administered medications on file prior to visit.      Social History     Tobacco Use    Smoking status: Never    Smokeless tobacco: Never   Vaping Use    Vaping status: Never Used   Substance and Sexual Activity    Alcohol use: Not Currently     Comment: special ocassion    Drug use: Never    Sexual activity: Yes     Partners: Male     Birth control/protection: Female Sterilization       Objective     /90   Pulse (!) 113   Temp 98 °F (36.7 °C)   Ht 5' 1\" (1.549 m)   Wt 86.7 kg (191 lb 3.2 oz)   SpO2 98%   BMI 36.13 kg/m²     Physical Exam  Vitals and nursing note reviewed.   Constitutional:       General: She is not in acute distress.     Appearance: Normal appearance. She is obese.   HENT:      Head: Normocephalic and atraumatic.   Eyes:      Extraocular Movements: Extraocular movements intact.      Conjunctiva/sclera: Conjunctivae normal.      Pupils: Pupils are equal, round, and reactive to light.   Cardiovascular:      Rate and Rhythm: Regular rhythm. Tachycardia present.      Heart sounds: Normal heart sounds. No murmur heard.  Pulmonary:      Effort: Pulmonary effort is normal. No respiratory distress.      Breath sounds: Normal breath sounds. No wheezing.   Musculoskeletal:      Cervical back: Normal range of motion and neck supple.      Right lower leg: No edema.      Left lower leg: No edema.   Skin:     General: Skin is warm and dry. "   Neurological:      General: No focal deficit present.      Mental Status: She is alert and oriented to person, place, and time.      Cranial Nerves: No cranial nerve deficit.      Motor: No weakness.   Psychiatric:         Mood and Affect: Mood normal.         Behavior: Behavior normal.

## 2024-09-27 NOTE — ASSESSMENT & PLAN NOTE
Reports being prescribed metoprolol for this while living in [place] but stopped taking it as she does not like to take medications.

## 2024-09-27 NOTE — ASSESSMENT & PLAN NOTE
Referral provided to weight management.  Labs as ordered.  Orders:    Ambulatory Referral to Weight Management; Future

## 2024-09-30 ENCOUNTER — APPOINTMENT (OUTPATIENT)
Dept: LAB | Facility: HOSPITAL | Age: 44
End: 2024-09-30
Payer: COMMERCIAL

## 2024-09-30 DIAGNOSIS — Z13.29 SCREENING FOR ENDOCRINE, METABOLIC AND IMMUNITY DISORDER: ICD-10-CM

## 2024-09-30 DIAGNOSIS — Z11.4 SCREENING FOR HIV (HUMAN IMMUNODEFICIENCY VIRUS): ICD-10-CM

## 2024-09-30 DIAGNOSIS — Z13.228 SCREENING FOR ENDOCRINE, METABOLIC AND IMMUNITY DISORDER: ICD-10-CM

## 2024-09-30 DIAGNOSIS — Z11.59 NEED FOR HEPATITIS C SCREENING TEST: ICD-10-CM

## 2024-09-30 DIAGNOSIS — Z13.0 SCREENING FOR ENDOCRINE, METABOLIC AND IMMUNITY DISORDER: ICD-10-CM

## 2024-09-30 LAB
ALBUMIN SERPL BCG-MCNC: 4 G/DL (ref 3.5–5)
ALP SERPL-CCNC: 73 U/L (ref 34–104)
ALT SERPL W P-5'-P-CCNC: 9 U/L (ref 7–52)
ANION GAP SERPL CALCULATED.3IONS-SCNC: 7 MMOL/L (ref 4–13)
AST SERPL W P-5'-P-CCNC: 12 U/L (ref 13–39)
BASOPHILS # BLD AUTO: 0.04 THOUSANDS/ÂΜL (ref 0–0.1)
BASOPHILS NFR BLD AUTO: 1 % (ref 0–1)
BILIRUB SERPL-MCNC: 0.32 MG/DL (ref 0.2–1)
BUN SERPL-MCNC: 12 MG/DL (ref 5–25)
CALCIUM SERPL-MCNC: 8.8 MG/DL (ref 8.4–10.2)
CHLORIDE SERPL-SCNC: 106 MMOL/L (ref 96–108)
CHOLEST SERPL-MCNC: 178 MG/DL
CO2 SERPL-SCNC: 26 MMOL/L (ref 21–32)
CREAT SERPL-MCNC: 0.77 MG/DL (ref 0.6–1.3)
EOSINOPHIL # BLD AUTO: 0.16 THOUSAND/ÂΜL (ref 0–0.61)
EOSINOPHIL NFR BLD AUTO: 2 % (ref 0–6)
ERYTHROCYTE [DISTWIDTH] IN BLOOD BY AUTOMATED COUNT: 16.2 % (ref 11.6–15.1)
GFR SERPL CREATININE-BSD FRML MDRD: 94 ML/MIN/1.73SQ M
GLUCOSE P FAST SERPL-MCNC: 98 MG/DL (ref 65–99)
HCT VFR BLD AUTO: 33 % (ref 34.8–46.1)
HCV AB SER QL: NORMAL
HDLC SERPL-MCNC: 50 MG/DL
HGB BLD-MCNC: 10 G/DL (ref 11.5–15.4)
HIV 1+2 AB+HIV1 P24 AG SERPL QL IA: NORMAL
HIV 2 AB SERPL QL IA: NORMAL
HIV1 AB SERPL QL IA: NORMAL
HIV1 P24 AG SERPL QL IA: NORMAL
IMM GRANULOCYTES # BLD AUTO: 0.02 THOUSAND/UL (ref 0–0.2)
IMM GRANULOCYTES NFR BLD AUTO: 0 % (ref 0–2)
LDLC SERPL CALC-MCNC: 111 MG/DL (ref 0–100)
LYMPHOCYTES # BLD AUTO: 2.2 THOUSANDS/ÂΜL (ref 0.6–4.47)
LYMPHOCYTES NFR BLD AUTO: 26 % (ref 14–44)
MCH RBC QN AUTO: 25.1 PG (ref 26.8–34.3)
MCHC RBC AUTO-ENTMCNC: 30.3 G/DL (ref 31.4–37.4)
MCV RBC AUTO: 83 FL (ref 82–98)
MONOCYTES # BLD AUTO: 0.71 THOUSAND/ÂΜL (ref 0.17–1.22)
MONOCYTES NFR BLD AUTO: 8 % (ref 4–12)
NEUTROPHILS # BLD AUTO: 5.31 THOUSANDS/ÂΜL (ref 1.85–7.62)
NEUTS SEG NFR BLD AUTO: 63 % (ref 43–75)
NONHDLC SERPL-MCNC: 128 MG/DL
NRBC BLD AUTO-RTO: 0 /100 WBCS
PLATELET # BLD AUTO: 485 THOUSANDS/UL (ref 149–390)
PMV BLD AUTO: 8.4 FL (ref 8.9–12.7)
POTASSIUM SERPL-SCNC: 3.8 MMOL/L (ref 3.5–5.3)
PROT SERPL-MCNC: 7.3 G/DL (ref 6.4–8.4)
RBC # BLD AUTO: 3.99 MILLION/UL (ref 3.81–5.12)
SODIUM SERPL-SCNC: 139 MMOL/L (ref 135–147)
TRIGL SERPL-MCNC: 86 MG/DL
TSH SERPL DL<=0.05 MIU/L-ACNC: 2.89 UIU/ML (ref 0.45–4.5)
WBC # BLD AUTO: 8.44 THOUSAND/UL (ref 4.31–10.16)

## 2024-09-30 PROCEDURE — 80061 LIPID PANEL: CPT

## 2024-09-30 PROCEDURE — 85025 COMPLETE CBC W/AUTO DIFF WBC: CPT

## 2024-09-30 PROCEDURE — 86803 HEPATITIS C AB TEST: CPT

## 2024-09-30 PROCEDURE — 80053 COMPREHEN METABOLIC PANEL: CPT

## 2024-09-30 PROCEDURE — 84443 ASSAY THYROID STIM HORMONE: CPT

## 2024-09-30 PROCEDURE — 36415 COLL VENOUS BLD VENIPUNCTURE: CPT

## 2024-09-30 PROCEDURE — 87389 HIV-1 AG W/HIV-1&-2 AB AG IA: CPT

## 2024-10-01 ENCOUNTER — TELEPHONE (OUTPATIENT)
Dept: FAMILY MEDICINE CLINIC | Facility: CLINIC | Age: 44
End: 2024-10-01

## 2024-10-01 DIAGNOSIS — D50.0 BLOOD LOSS ANEMIA: Primary | ICD-10-CM

## 2024-10-01 NOTE — TELEPHONE ENCOUNTER
Spoke with patient. Informed patient about results per provider notes. Patient states she is not longer bleeding she is just spotting. Informed patient about iron panel. Patient verbalized understanding.----- Message from Chip Ramírez PA-C sent at 10/1/2024  1:47 PM EDT -----  LDL cholesterol is slightly elevated.  Recommend focusing on healthy diet.  Hemoglobin is low, likely in the setting of heavy menstrual bleeding. Order placed to recheck along with iron panel.  Has menstrual bleeding stopped?  Labs otherwise WNL.

## 2024-10-01 NOTE — TELEPHONE ENCOUNTER
Patient called office. Returned call to patient. Patient is asking about lab results. She [facility] Would like provider to review labs and let her know. Patient has appointment scheduled on 10/28 for labs review. She'd like to be seen sooner. Patient is going on a trip won't be in the country and would like to discuss further with provider.   Could you please review patient's and patient's daughter labs? If needed I can call her back and schedule a sooner appt to discuss further. If anything else is needed let me know. Thank you!

## 2024-10-17 ENCOUNTER — OFFICE VISIT (OUTPATIENT)
Dept: URGENT CARE | Facility: MEDICAL CENTER | Age: 44
End: 2024-10-17
Payer: COMMERCIAL

## 2024-10-17 VITALS
SYSTOLIC BLOOD PRESSURE: 138 MMHG | RESPIRATION RATE: 15 BRPM | OXYGEN SATURATION: 98 % | DIASTOLIC BLOOD PRESSURE: 76 MMHG | TEMPERATURE: 97.2 F | HEART RATE: 91 BPM

## 2024-10-17 DIAGNOSIS — I10 HYPERTENSION, UNSPECIFIED TYPE: Primary | ICD-10-CM

## 2024-10-17 PROCEDURE — S9088 SERVICES PROVIDED IN URGENT: HCPCS

## 2024-10-17 PROCEDURE — 99213 OFFICE O/P EST LOW 20 MIN: CPT

## 2024-10-17 NOTE — PROGRESS NOTES
[facility]        NAME: Carlos Hurley is a 44 y.o. female  : 1980    MRN: [id number]  DATE: 2024  TIME: 11:23 AM    Assessment and Plan   Hypertension, unspecified type [I10]  1. Hypertension, unspecified type          /72 on recheck. Physical examination negative for signs of hypertensive emergency or organ damage/failure. Patient reports that she currently has an appointment scheduled with her PCP next week; advised to keep & attend appointment. Instructed to check BP at home, and provided with information of where equipment can be purchased.     Patient Instructions   Keep currently scheduled appointment with family provider in 2 weeks.     Uncontrolled blood pressure significantly increases your risk for heart failure, stroke, heart attack, coronary artery disease, peripheral artery disease, and chronic kidney disease.  Proceed to the emergency department if you develop severe headache, dizziness, vision disturbances, chest pain, shortness of breath, heart palpitations, decreased urine output, confusion, numbness or weakness.     Follow up with PCP in 3-5 days.  Proceed to  ER if symptoms worsen.    If tests have been performed at [facility], our office will contact you with results if changes need to be made to the care plan discussed with you at the visit.  You can review your full results on [facility] MyChart.    Chief Complaint     Chief Complaint   Patient presents with    possible hypertension     Used to take Metoprolol for BP when she was in [place]. Saw doctor last month for BP, was 158/103 and her Pulse was high. No medications were prescribed.          History of Present Illness       Patient is a 44 year old female with PMH of HTN who presents today for a blood pressure check.  line was used to obtain history. She reports that 10 years ago while living in [place] she was evaluated by cardiology and placed on metoprolol. Reports that 5  years ago she moved to the [place] and has not seen a cardiologist since so therefore has not taken her metoprolol in many years. Reports having seen her PCP last month however she was not prescribed any medications for her BP. Reports feeling as though her BP and HR are high while at home, however  does not actually check them at these times. Denies any CP, SOB, visual changes, numbness/tingling, or lower extremity edema.  is currently scheduled to see her PCP again next week.     Hypertension  This is a chronic problem. The current episode started more than 1 year ago. The problem is unchanged. Associated symptoms include anxiety and palpitations. Pertinent negatives include no blurred vision, chest pain, headaches, malaise/fatigue, neck pain, orthopnea, peripheral edema, PND, shortness of breath or sweats. Past treatments include beta blockers. There is no history of angina or PVD.       Review of Systems   Review of Systems   Constitutional:  Negative for activity change, appetite change, chills, fatigue, fever, malaise/fatigue and unexpected weight change.   HENT:  Negative for congestion, ear discharge, ear pain, nosebleeds, postnasal drip, rhinorrhea, sinus pressure, sinus pain, sore throat and trouble swallowing.    Eyes:  Negative for blurred vision, pain, redness, itching and visual disturbance.   Respiratory:  Negative for cough, chest tightness, shortness of breath and wheezing.    Cardiovascular:  Positive for palpitations. Negative for chest pain, orthopnea, leg swelling and PND.   Gastrointestinal:  Negative for abdominal distention, abdominal pain, constipation, diarrhea, nausea and vomiting.   Genitourinary:  Negative for decreased urine volume, difficulty urinating and flank pain.   Musculoskeletal:  Negative for arthralgias, back pain, gait problem, joint swelling, myalgias and neck pain.   Skin:  Negative for color change, pallor and rash.   Neurological:  Negative for  dizziness, syncope, weakness, light-headedness, numbness and headaches.   Psychiatric/Behavioral:  The patient is nervous/anxious.    All other systems reviewed and are negative.        Current Medications       Current Outpatient Medications:     ibuprofen (MOTRIN) 600 mg tablet, Take 1 tablet (600 mg total) by mouth 2 (two) times a day, Disp: 30 tablet, Rfl: 0    Current Allergies     Allergies as of 10/17/2024    (No Known Allergies)            The following portions of the patient's history were reviewed and updated as appropriate: allergies, current medications, past family history, past medical history, past social history, past surgical history and problem list.     Past Medical History:   Diagnosis Date    Hypertension     Obesity        Past Surgical History:   Procedure Laterality Date     SECTION      x 2    TUBAL LIGATION         Family History   Problem Relation Age of Onset    Osteoporosis Mother     Diabetes Mother     Hypertension Mother     Lung cancer Mother     Thyroid disease Father     Other Sister         flu     No Known Problems Brother     No Known Problems Daughter     No Known Problems Son     No Known Problems Son     Colon cancer Maternal Grandmother     No Known Problems Maternal Grandfather     No Known Problems Paternal Grandmother     Other Paternal Grandfather         accidental     No Known Problems Paternal Aunt     No Known Problems Paternal Aunt     No Known Problems Paternal Aunt     Breast cancer Neg Hx     Ovarian cancer Neg Hx          Medications have been verified.        Objective   /76   Pulse 91   Temp (!) 97.2 °F (36.2 °C)   Resp 15   LMP 09/15/2024 (Approximate)   SpO2 98%   Patient's last menstrual period was 09/15/2024 (approximate).       Physical Exam     Physical Exam  Vitals and nursing note reviewed.   Constitutional:       General: She is not in acute distress.     Appearance: Normal appearance. She is not ill-appearing or diaphoretic.    HENT:      Head: Normocephalic and atraumatic.      Right Ear: Tympanic membrane, ear canal and external ear normal.      Left Ear: Tympanic membrane, ear canal and external ear normal.      Nose: Nose normal. No congestion or rhinorrhea.      Mouth/Throat:      Mouth: Mucous membranes are moist.      Pharynx: Oropharynx is clear. No oropharyngeal exudate or posterior oropharyngeal erythema.   Eyes:      General:         Right eye: No discharge.         Left eye: No discharge.      Extraocular Movements: Extraocular movements intact.      Conjunctiva/sclera: Conjunctivae normal.      Pupils: Pupils are equal, round, and reactive to light.   Cardiovascular:      Rate and Rhythm: Normal rate and regular rhythm.      Pulses: Normal pulses.      Heart sounds: Normal heart sounds.   Pulmonary:      Effort: Pulmonary effort is normal. No respiratory distress.      Breath sounds: Normal breath sounds. No stridor. No wheezing, rhonchi or rales.   Chest:      Chest wall: No tenderness.   Abdominal:      General: Abdomen is flat. There is no distension.      Palpations: Abdomen is soft. There is no mass.      Tenderness: There is no abdominal tenderness. There is no guarding.   Musculoskeletal:         General: No swelling, tenderness or signs of injury. Normal range of motion.      Cervical back: Normal range of motion and neck supple. No rigidity or tenderness.   Lymphadenopathy:      Cervical: No cervical adenopathy.   Skin:     General: Skin is warm and dry.      Capillary Refill: Capillary refill takes less than 2 seconds.      Coloration: Skin is not pale.      Findings: No erythema or rash.   Neurological:      General: No focal deficit present.      Mental Status: She is alert. Mental status is at baseline.   Psychiatric:         Mood and Affect: Mood normal.         Behavior: Behavior normal.         Thought Content: Thought content normal.         Judgment: Judgment normal.

## 2024-10-17 NOTE — PATIENT INSTRUCTIONS
Keep currently scheduled appointment with family provider in 2 weeks.     Uncontrolled blood pressure significantly increases your risk for heart failure, stroke, heart attack, coronary artery disease, peripheral artery disease, and chronic kidney disease.  Proceed to the emergency department if you develop severe headache, dizziness, vision disturbances, chest pain, shortness of breath, heart palpitations, decreased urine output, confusion, numbness or weakness.     Follow up with PCP in 3-5 days.  Proceed to  ER if symptoms worsen.    If tests have been performed at [facility], our office will contact you with results if changes need to be made to the care plan discussed with you at the visit.  You can review your full results on [facility] MyChart.

## 2024-10-28 ENCOUNTER — OFFICE VISIT (OUTPATIENT)
Dept: FAMILY MEDICINE CLINIC | Facility: HOME HEALTHCARE | Age: 44
End: 2024-10-28
Payer: COMMERCIAL

## 2024-10-28 VITALS
RESPIRATION RATE: 18 BRPM | BODY MASS INDEX: 36.99 KG/M2 | HEIGHT: 60 IN | SYSTOLIC BLOOD PRESSURE: 122 MMHG | DIASTOLIC BLOOD PRESSURE: 86 MMHG | OXYGEN SATURATION: 98 % | TEMPERATURE: 98.6 F | HEART RATE: 104 BPM | WEIGHT: 188.4 LBS

## 2024-10-28 DIAGNOSIS — D50.0 BLOOD LOSS ANEMIA: ICD-10-CM

## 2024-10-28 DIAGNOSIS — N92.1 MENORRHAGIA WITH IRREGULAR CYCLE: Primary | ICD-10-CM

## 2024-10-28 PROCEDURE — T1015 CLINIC SERVICE: HCPCS | Performed by: FAMILY MEDICINE

## 2024-10-28 NOTE — PROGRESS NOTES
Ambulatory Visit  Name: Carlos Hurley      : 1980      MRN: [id number]  Encounter Provider: Chip Ramírez PA-C  Encounter Date: 10/28/2024   Encounter department: [facility]    Assessment & Plan  Menorrhagia with irregular cycle  May continue ibuprofen as needed.  Follow-up with GYN as scheduled.       Blood loss anemia  Repeat CBC with iron panel labs ordered.         Depression Screening and Follow-up Plan: Patient was screened for depression during today's encounter. They screened negative with a PHQ-2 score of 0.      History of Present Illness     Alyssa is a 44-year-old female presenting for follow-up.    At last visit, patient endorsed heavy menstrual bleeding which have been persistent for approximately 2 weeks.  She had labs which revealed a hemoglobin low at 10.0.  Patient reports that her bleeding stopped on 2024 and she did not have any additional bleeding in the month of October thus far.  Scheduled to see GYN in January.        History obtained from : patient  Review of Systems   Constitutional:  Negative for chills, diaphoresis and fever.   Respiratory:  Negative for cough, chest tightness, shortness of breath and wheezing.    Cardiovascular:  Negative for chest pain, palpitations and leg swelling.   Gastrointestinal:  Negative for abdominal pain, constipation, diarrhea, nausea and vomiting.   Genitourinary:  Positive for menstrual problem.   Skin:  Negative for rash and wound.   Neurological:  Negative for dizziness, syncope, weakness, light-headedness and headaches.   Psychiatric/Behavioral:  Negative for decreased concentration, dysphoric mood, self-injury, sleep disturbance and suicidal ideas. The patient is not nervous/anxious.      Medical History Reviewed by provider this encounter:  Tobacco  Allergies  Meds  Problems  Med Hx  Surg Hx  Fam Hx       Current Outpatient Medications on File Prior to Visit   Medication Sig Dispense  Refill    ibuprofen (MOTRIN) 600 mg tablet Take 1 tablet (600 mg total) by mouth 2 (two) times a day 30 tablet 0     No current facility-administered medications on file prior to visit.      Social History     Tobacco Use    Smoking status: Never    Smokeless tobacco: Never   Vaping Use    Vaping status: Never Used   Substance and Sexual Activity    Alcohol use: Not Currently     Comment: special ocassion    Drug use: Never    Sexual activity: Yes     Partners: Male     Birth control/protection: Female Sterilization         Objective     /86 (BP Location: Left arm, Patient Position: Sitting, Cuff Size: Large)   Pulse 104   Temp 98.6 °F (37 °C)   Resp 18   Ht 5' (1.524 m)   Wt 85.5 kg (188 lb 6.4 oz)   LMP 09/15/2024 (Approximate)   SpO2 98%   BMI 36.79 kg/m²     Physical Exam  Vitals and nursing note reviewed.   Constitutional:       General: She is not in acute distress.     Appearance: Normal appearance. She is obese.   HENT:      Head: Normocephalic and atraumatic.   Eyes:      Extraocular Movements: Extraocular movements intact.      Conjunctiva/sclera: Conjunctivae normal.      Pupils: Pupils are equal, round, and reactive to light.   Cardiovascular:      Rate and Rhythm: Normal rate and regular rhythm.      Heart sounds: Normal heart sounds. No murmur heard.  Pulmonary:      Effort: Pulmonary effort is normal. No respiratory distress.      Breath sounds: Normal breath sounds. No wheezing.   Musculoskeletal:      Cervical back: Normal range of motion and neck supple.      Right lower leg: No edema.      Left lower leg: No edema.   Skin:     General: Skin is warm and dry.   Neurological:      General: No focal deficit present.      Mental Status: She is alert and oriented to person, place, and time.      Cranial Nerves: No cranial nerve deficit.      Motor: No weakness.   Psychiatric:         Mood and Affect: Mood normal.         Behavior: Behavior normal.

## 2025-01-13 NOTE — PROGRESS NOTES
Name: Carlos Hurley      : 1980      MRN: [id number]  Encounter Provider: YESSI Lindsay  Encounter Date: 2025   Encounter department: [facility]  :  Assessment & Plan  Well woman exam with routine gynecological exam  All questions answered.  Await pap smear results.  Breast self exam technique reviewed and patient encouraged to perform self-exam monthly.  Diagnosis explained in detail, including differential.  Dietary diary.  Discussed healthy lifestyle modifications.  Educational material distributed.  Follow up in 1 year.  Follow up as needed.  Sono-mammogram.  Thin prep Pap smear.  Breast awareness reviewed   Encouraged healthy diet, exercise and lifestyle  Encouraged follow-up with PCP as needed  Encouraged seasonal influenza vaccination  Gardasil vaccine series reviewed.  Written information provided.  Orders:    Liquid-based pap, screening    Dense breast tissue on mammogram, unspecified type  Reviewed recommendation for ABUS.  Encouraged to schedule with mammogram. Has mammogram ordered by PCP  Orders:    US breast screening bilateral complete (ABUS); Future    Encounter for breast cancer screening using non-mammogram modality    Orders:    US breast screening bilateral complete (ABUS); Future    Menorrhagia with irregular cycle  Reviewed options for menstrual control including NSAIDs, hormonal and briefly surgical.  Patient is not interested in surgical or hormonal at this time  Reviewed use of Aleve 1 tablet twice daily or IBU 3 times a day with food starting day prior to menses through days that are heavy  Encouraged to complete repeat labs ordered by PCP  Reviewed with patient weight loss can help regulate menses.  Reviewed increasing iron in diet.  Supplementation will be determined by pending lab results    Will call/ [employer] message with results  VBI-    BMI Counseling: Body mass index is 37.42 kg/m². The BMI is above normal. Nutrition  recommendations include 3-5 servings of fruits/vegetables daily. Exercise recommendations include exercising 3-5 times per week.    RTO 1 year for annual exam     History of Present Illness   HPI  Carlos Hurley is a 44 y.o. female who presents  for annual well woman exam.  Last Pap smear  12/14/24 NILM/ HR HPV(-).  Last mammogram  8/29/23 birads 1. Periods are typically regular every 28-30 days, lasting 5 days.  For the past 6 months menses have been every other month lasting 5 days.  Did have a cycle in September that lasted about 15 days with heavy bleeding.  Has not had extended bleeding since that time.  No intermenstrual bleeding, spotting, or discharge.    Current contraception: tubal ligation  History of abnormal Pap smear: no  Family history of uterine or ovarian cancer: no  Regular self breast exam: no  History of abnormal mammogram: no  Family history of breast cancer: no  History of abnormal lipids: no  Gardasil vaccine: Unsure    History obtained from: patient.  Patient is primarily [place]-speaking.  Prefers to use daughter for translation as needed.  Declined [employer]     Review of Systems   Constitutional:  Negative for chills and fever.   Respiratory: Negative.     Cardiovascular: Negative.      Medical History Reviewed by provider this encounter:  Tobacco  Allergies  Meds  Problems  Med Hx  Surg Hx  Fam Hx  Soc   Hx    .     Objective   /88   Ht 5' (1.524 m)   Wt 86.9 kg (191 lb 9.6 oz)   LMP 01/14/2025 (Exact Date)   BMI 37.42 kg/m²      Physical Exam  General:   alert and oriented, in no acute distress, alert, appears stated age, and cooperative   Heart: regular rate and rhythm, S1, S2 normal, no murmur, click, rub or gallop   Lungs: clear to auscultation bilaterally   Abdomen: soft, non-tender, without masses or organomegaly   Vulva: normal, Bartholin's, Urethra, Laurens's normal   Vagina: normal mucosa, normal discharge, no palpable nodules   Cervix: no bleeding  following Pap, no cervical motion tenderness, and no lesions   Uterus: non-tender, difficult to assess due to body habitus   Adnexa: no mass, fullness, tenderness and difficult to assess due to body habitus   Breast: breasts appear normal, no suspicious masses, no skin or nipple changes or axillary nodes.          Menstrual History:  OB History          3    Para   3    Term   2       1    AB        Living   3         SAB        IAB        Ectopic        Multiple        Live Births   3           Obstetric Comments   Menarche age 14              Menarche age: 14  Patient's last menstrual period was 2025 (exact date).  Period Cycle (Days):  (past 6 months every other month)  Period Duration (Days): 5 (November lasted 15 days)  Period Pattern: (!) Irregular (every other month)  Menstrual Flow: Heavy  Menstrual Control: Tampon, Thin pad  Menstrual Control Change Freq (Hours): 4 hours  Dysmenorrhea: (!) Mild (IBU as needed)  Dysmenorrhea Symptoms: Headache

## 2025-01-13 NOTE — ASSESSMENT & PLAN NOTE
Reviewed recommendation for ABUS.  Encouraged to schedule with mammogram. Has mammogram ordered by PCP  Orders:    US breast screening bilateral complete (ABUS); Future

## 2025-01-14 ENCOUNTER — ANNUAL EXAM (OUTPATIENT)
Dept: OBGYN CLINIC | Facility: CLINIC | Age: 45
End: 2025-01-14
Payer: COMMERCIAL

## 2025-01-14 VITALS
HEIGHT: 60 IN | SYSTOLIC BLOOD PRESSURE: 140 MMHG | BODY MASS INDEX: 37.62 KG/M2 | DIASTOLIC BLOOD PRESSURE: 88 MMHG | WEIGHT: 191.6 LBS

## 2025-01-14 DIAGNOSIS — N92.1 MENORRHAGIA WITH IRREGULAR CYCLE: ICD-10-CM

## 2025-01-14 DIAGNOSIS — Z12.39 ENCOUNTER FOR BREAST CANCER SCREENING USING NON-MAMMOGRAM MODALITY: ICD-10-CM

## 2025-01-14 DIAGNOSIS — Z01.419 WELL WOMAN EXAM WITH ROUTINE GYNECOLOGICAL EXAM: Primary | ICD-10-CM

## 2025-01-14 DIAGNOSIS — R92.30 DENSE BREAST TISSUE ON MAMMOGRAM, UNSPECIFIED TYPE: ICD-10-CM

## 2025-01-14 PROCEDURE — G0476 HPV COMBO ASSAY CA SCREEN: HCPCS | Performed by: NURSE PRACTITIONER

## 2025-01-14 PROCEDURE — 99396 PREV VISIT EST AGE 40-64: CPT | Performed by: NURSE PRACTITIONER

## 2025-01-14 PROCEDURE — G0145 SCR C/V CYTO,THINLAYER,RESCR: HCPCS | Performed by: NURSE PRACTITIONER

## 2025-01-20 ENCOUNTER — RESULTS FOLLOW-UP (OUTPATIENT)
Dept: OBGYN CLINIC | Facility: CLINIC | Age: 45
End: 2025-01-20

## 2025-01-20 LAB
LAB AP GYN PRIMARY INTERPRETATION: NORMAL
Lab: NORMAL

## 2025-01-20 NOTE — TELEPHONE ENCOUNTER
Reviewed negative/normal pap smear results with Lizbeth NUNEZ, assistance with [place] interpretation.

## 2025-01-20 NOTE — TELEPHONE ENCOUNTER
Pt was left a VM to call back for results and spoke with [facility] nurse Helen. Was told PAP and HPV results were all negative I translated the information to the pt who is [place] speaking only was asked by nurse if she had any further questions pt said no.

## 2025-01-21 ENCOUNTER — APPOINTMENT (OUTPATIENT)
Dept: LAB | Facility: HOSPITAL | Age: 45
End: 2025-01-21
Payer: COMMERCIAL

## 2025-01-21 DIAGNOSIS — D50.0 BLOOD LOSS ANEMIA: ICD-10-CM

## 2025-01-21 LAB
BASOPHILS # BLD AUTO: 0.05 THOUSANDS/ΜL (ref 0–0.1)
BASOPHILS NFR BLD AUTO: 1 % (ref 0–1)
EOSINOPHIL # BLD AUTO: 0.13 THOUSAND/ΜL (ref 0–0.61)
EOSINOPHIL NFR BLD AUTO: 2 % (ref 0–6)
ERYTHROCYTE [DISTWIDTH] IN BLOOD BY AUTOMATED COUNT: 16.3 % (ref 11.6–15.1)
FERRITIN SERPL-MCNC: 3 NG/ML (ref 11–307)
HCT VFR BLD AUTO: 37.4 % (ref 34.8–46.1)
HGB BLD-MCNC: 11.1 G/DL (ref 11.5–15.4)
IMM GRANULOCYTES # BLD AUTO: 0.02 THOUSAND/UL (ref 0–0.2)
IMM GRANULOCYTES NFR BLD AUTO: 0 % (ref 0–2)
IRON SATN MFR SERPL: 4 % (ref 15–50)
IRON SERPL-MCNC: 21 UG/DL (ref 50–212)
LYMPHOCYTES # BLD AUTO: 2.26 THOUSANDS/ΜL (ref 0.6–4.47)
LYMPHOCYTES NFR BLD AUTO: 29 % (ref 14–44)
MCH RBC QN AUTO: 23.2 PG (ref 26.8–34.3)
MCHC RBC AUTO-ENTMCNC: 29.7 G/DL (ref 31.4–37.4)
MCV RBC AUTO: 78 FL (ref 82–98)
MONOCYTES # BLD AUTO: 0.68 THOUSAND/ΜL (ref 0.17–1.22)
MONOCYTES NFR BLD AUTO: 9 % (ref 4–12)
NEUTROPHILS # BLD AUTO: 4.68 THOUSANDS/ΜL (ref 1.85–7.62)
NEUTS SEG NFR BLD AUTO: 59 % (ref 43–75)
NRBC BLD AUTO-RTO: 0 /100 WBCS
PLATELET # BLD AUTO: 489 THOUSANDS/UL (ref 149–390)
PMV BLD AUTO: 9 FL (ref 8.9–12.7)
RBC # BLD AUTO: 4.79 MILLION/UL (ref 3.81–5.12)
TIBC SERPL-MCNC: 518 UG/DL (ref 250–450)
TRANSFERRIN SERPL-MCNC: 370 MG/DL (ref 203–362)
UIBC SERPL-MCNC: 497 UG/DL (ref 155–355)
WBC # BLD AUTO: 7.82 THOUSAND/UL (ref 4.31–10.16)

## 2025-01-21 PROCEDURE — 36415 COLL VENOUS BLD VENIPUNCTURE: CPT

## 2025-01-21 PROCEDURE — 82728 ASSAY OF FERRITIN: CPT

## 2025-01-21 PROCEDURE — 85025 COMPLETE CBC W/AUTO DIFF WBC: CPT

## 2025-01-21 PROCEDURE — 83540 ASSAY OF IRON: CPT

## 2025-01-21 PROCEDURE — 83550 IRON BINDING TEST: CPT

## 2025-01-28 ENCOUNTER — OFFICE VISIT (OUTPATIENT)
Dept: FAMILY MEDICINE CLINIC | Facility: HOME HEALTHCARE | Age: 45
End: 2025-01-28
Payer: COMMERCIAL

## 2025-01-28 VITALS
RESPIRATION RATE: 18 BRPM | DIASTOLIC BLOOD PRESSURE: 90 MMHG | HEIGHT: 60 IN | WEIGHT: 194.8 LBS | HEART RATE: 102 BPM | TEMPERATURE: 98.4 F | SYSTOLIC BLOOD PRESSURE: 138 MMHG | OXYGEN SATURATION: 98 % | BODY MASS INDEX: 38.24 KG/M2

## 2025-01-28 DIAGNOSIS — D50.0 IRON DEFICIENCY ANEMIA DUE TO CHRONIC BLOOD LOSS: Primary | ICD-10-CM

## 2025-01-28 PROCEDURE — T1015 CLINIC SERVICE: HCPCS | Performed by: FAMILY MEDICINE

## 2025-01-28 RX ORDER — FERROUS SULFATE 324(65)MG
324 TABLET, DELAYED RELEASE (ENTERIC COATED) ORAL
Qty: 60 TABLET | Refills: 2 | Status: SHIPPED | OUTPATIENT
Start: 2025-01-28

## 2025-01-28 NOTE — PROGRESS NOTES
Name: Carlos Hurley      : 1980      MRN: [id number]  Encounter Provider: Chip Ramírez PA-C  Encounter Date: 2025   Encounter department: [facility]  :  Assessment & Plan  Iron deficiency anemia due to chronic blood loss  CBC 2024 with Hgb 10.0, improved to 11.1 this month.  Iron panel with iron saturation 4, total iron 21, and ferritin 3.  Will start oral iron supplementation and repeat labs in 3 months.  Would consider iron infusions if levels remain low.  Continue regular follow up with GYN.  Orders:  •  ferrous sulfate 324 (65 Fe) mg; Take 1 tablet (324 mg total) by mouth 2 (two) times a day before meals           History of Present Illness   Alyssa is a 44-year-old female with history of tachycardia and iron deficiency anemia, presenting for follow-up.    Patient has iron deficiency anemia in the setting of menorrhagia.  CBC 2024 with Hgb 10.0, improved to 11.1 this month.  Iron panel with iron saturation 4, total iron 21, and ferritin 3.  She is not currently taking any supplementation.  Saw GYN 2 weeks ago but is not interested in any hormonal/surgical options to regulate cycles at this time.      Review of Systems   Constitutional:  Negative for chills, diaphoresis and fever.   Respiratory:  Negative for cough, chest tightness, shortness of breath and wheezing.    Cardiovascular:  Negative for chest pain, palpitations and leg swelling.   Gastrointestinal:  Negative for abdominal pain, constipation, diarrhea, nausea and vomiting.   Genitourinary:  Positive for menstrual problem.   Skin:  Negative for rash and wound.   Neurological:  Negative for dizziness, syncope, weakness, light-headedness and headaches.   Psychiatric/Behavioral:  Negative for decreased concentration, dysphoric mood, self-injury, sleep disturbance and suicidal ideas. The patient is not nervous/anxious.        Objective   /90 (BP Location: Left arm, Patient Position:  Sitting, Cuff Size: Large)   Pulse 102   Temp 98.4 °F (36.9 °C) (Tympanic)   Resp 18   Ht 5' (1.524 m)   Wt 88.4 kg (194 lb 12.8 oz)   LMP 01/14/2025 (Exact Date)   SpO2 98%   BMI 38.04 kg/m²      Physical Exam  Vitals and nursing note reviewed.   Constitutional:       General: She is not in acute distress.     Appearance: Normal appearance. She is obese.   HENT:      Head: Normocephalic and atraumatic.   Eyes:      Extraocular Movements: Extraocular movements intact.      Conjunctiva/sclera: Conjunctivae normal.      Pupils: Pupils are equal, round, and reactive to light.   Cardiovascular:      Rate and Rhythm: Normal rate and regular rhythm.      Heart sounds: Normal heart sounds. No murmur heard.  Pulmonary:      Effort: Pulmonary effort is normal. No respiratory distress.      Breath sounds: Normal breath sounds. No wheezing.   Musculoskeletal:      Cervical back: Normal range of motion and neck supple.      Right lower leg: No edema.      Left lower leg: No edema.   Skin:     General: Skin is warm and dry.   Neurological:      General: No focal deficit present.      Mental Status: She is alert and oriented to person, place, and time.      Cranial Nerves: No cranial nerve deficit.      Motor: No weakness.   Psychiatric:         Mood and Affect: Mood normal.         Behavior: Behavior normal.

## 2025-01-28 NOTE — ASSESSMENT & PLAN NOTE
CBC 9/2024 with Hgb 10.0, improved to 11.1 this month.  Iron panel with iron saturation 4, total iron 21, and ferritin 3.  Will start oral iron supplementation and repeat labs in 3 months.  Would consider iron infusions if levels remain low.  Continue regular follow up with GYN.  Orders:  •  ferrous sulfate 324 (65 Fe) mg; Take 1 tablet (324 mg total) by mouth 2 (two) times a day before meals